# Patient Record
Sex: MALE | Race: WHITE | NOT HISPANIC OR LATINO
[De-identification: names, ages, dates, MRNs, and addresses within clinical notes are randomized per-mention and may not be internally consistent; named-entity substitution may affect disease eponyms.]

---

## 2019-03-02 ENCOUNTER — TRANSCRIPTION ENCOUNTER (OUTPATIENT)
Age: 69
End: 2019-03-02

## 2019-03-03 ENCOUNTER — EMERGENCY (EMERGENCY)
Facility: HOSPITAL | Age: 69
LOS: 1 days | Discharge: HOME | End: 2019-03-03
Attending: EMERGENCY MEDICINE | Admitting: EMERGENCY MEDICINE
Payer: MEDICARE

## 2019-03-03 VITALS
HEIGHT: 70 IN | RESPIRATION RATE: 16 BRPM | OXYGEN SATURATION: 92 % | WEIGHT: 190.04 LBS | TEMPERATURE: 98 F | HEART RATE: 76 BPM | SYSTOLIC BLOOD PRESSURE: 109 MMHG | DIASTOLIC BLOOD PRESSURE: 84 MMHG

## 2019-03-03 VITALS
TEMPERATURE: 98 F | HEART RATE: 73 BPM | DIASTOLIC BLOOD PRESSURE: 84 MMHG | RESPIRATION RATE: 16 BRPM | SYSTOLIC BLOOD PRESSURE: 151 MMHG | OXYGEN SATURATION: 97 %

## 2019-03-03 PROCEDURE — 70450 CT HEAD/BRAIN W/O DYE: CPT

## 2019-03-03 PROCEDURE — 72125 CT NECK SPINE W/O DYE: CPT | Mod: 26

## 2019-03-03 PROCEDURE — 99284 EMERGENCY DEPT VISIT MOD MDM: CPT | Mod: 25

## 2019-03-03 PROCEDURE — 14040 TIS TRNFR F/C/C/M/N/A/G/H/F: CPT

## 2019-03-03 PROCEDURE — 12051 INTMD RPR FACE/MM 2.5 CM/<: CPT

## 2019-03-03 PROCEDURE — 70450 CT HEAD/BRAIN W/O DYE: CPT | Mod: 26

## 2019-03-03 PROCEDURE — 72125 CT NECK SPINE W/O DYE: CPT

## 2019-03-03 PROCEDURE — 99284 EMERGENCY DEPT VISIT MOD MDM: CPT

## 2019-03-03 PROCEDURE — 96372 THER/PROPH/DIAG INJ SC/IM: CPT

## 2019-03-03 RX ORDER — MICONAZOLE NITRATE 2 %
1 CREAM (GRAM) TOPICAL
Qty: 0 | Refills: 0 | COMMUNITY

## 2019-03-03 RX ORDER — TELMISARTAN 20 MG/1
1 TABLET ORAL
Qty: 0 | Refills: 0 | COMMUNITY

## 2019-03-03 RX ORDER — NYSTATIN CREAM 100000 [USP'U]/G
1 CREAM TOPICAL
Qty: 0 | Refills: 0 | COMMUNITY

## 2019-03-03 RX ORDER — ACETAMINOPHEN 500 MG
2 TABLET ORAL
Qty: 0 | Refills: 0 | COMMUNITY

## 2019-03-03 RX ORDER — DUREZOL 0.5 MG/ML
1 EMULSION OPHTHALMIC
Qty: 0 | Refills: 0 | COMMUNITY

## 2019-03-03 RX ORDER — DIVALPROEX SODIUM 500 MG/1
4 TABLET, DELAYED RELEASE ORAL
Qty: 0 | Refills: 0 | COMMUNITY

## 2019-03-03 RX ORDER — CHOLECALCIFEROL (VITAMIN D3) 125 MCG
1 CAPSULE ORAL
Qty: 0 | Refills: 0 | COMMUNITY

## 2019-03-03 RX ORDER — MIRTAZAPINE 45 MG/1
1 TABLET, ORALLY DISINTEGRATING ORAL
Qty: 0 | Refills: 0 | COMMUNITY

## 2019-03-03 RX ORDER — DIVALPROEX SODIUM 500 MG/1
2 TABLET, DELAYED RELEASE ORAL
Qty: 0 | Refills: 0 | COMMUNITY

## 2019-03-03 RX ADMIN — Medication 2 MILLIGRAM(S): at 12:30

## 2019-03-03 NOTE — ED PROVIDER NOTE - OBJECTIVE STATEMENT
Per EMS, 67 y/o M pt with PMHx of dementia presents to the ED BIBA from [facility] c/o forehead laceration s/p unwitnessed fall today. Pt was found on the floor by assisted living staff. No new injury noted. No further complaints at this time. Full HPI limited due to pt's hx of dementia.

## 2019-03-03 NOTE — ED ADULT NURSE NOTE - OBJECTIVE STATEMENT
Pt BIBA from [facility] for a fall incident and laceration over the medial forehead area . Pt is confused, uncooperative and agitated at times. Pt presented with a laceration wound a triangular shape measuring 5x4x3 - Dressing soaked with blood. - Addition pressure dressing applied . Calming measures and reorientation measures provided.

## 2019-03-03 NOTE — CONSULT NOTE ADULT - SUBJECTIVE AND OBJECTIVE BOX
JONNIE HASNON  [id number]      68y y/o presents with laceration after mechanical fall. Patient denies LOC, changes in vision, changes in teeth alignment, nausea or emesis.  Patient agitated at moment.     Handoff  MEWS Score  Alzheimer disease  Hypertension  Facial laceration, initial encounter  FALL  Fall, initial encounter      Allergy Status Unknown      T(C): 37.3 (03-03-19 @ 13:06), Max: 37.3 (03-03-19 @ 13:06)  HR: 76 (03-03-19 @ 17:02) (66 - 76)  BP: 114/41 (03-03-19 @ 17:02) (109/84 - 148/60)  RR: 16 (03-03-19 @ 17:02) (16 - 16)  SpO2: 98% (03-03-19 @ 17:02) (92% - 98%)    NAD  HEENT:  EOMi.  PERRLA.  No facial tenderness.  Intranasal: No injuries.  Intraoral: No injuries.  NO loose dentures.  Laceration: 4cm x 3cm  in left forehead deep to periosteal layer with necrotic tissue.  CN2-12 intact.          EXAM:  CT BRAIN                            *** ADDENDUM 03/03/2019  ***        CT cervical spine without IV contrast        CLINICAL INFORMATION: Fracture, trauma, neck pain.  Neck pain, spinal   stenosis, spondylosis.    fall    TECHNIQUE:  Contiguous axial 2.0 mm sections were obtained through the   cervical spine using a single helical acquisition.  Additional 2 mm   sagittal and coronal reformatted reconstructions of the spine were   obtained.  These additional reformatted images were used to evaluate the   spine for alignment, vertebral fractures and the integrity of the the   posterior elements.   This scan was performed using automatic exposure   control (radiation dose reduction software) to obtain a diagnostic image   quality scan with patient dose as low as reasonably achievable.        FINDINGS:   No prior similar studies are available for review    Cervical vertebral body heights are maintained. No vertebral fracture is   seen. No destructive bone lesion is found.  Alignment is preserved.    Facet joints appear intact and aligned.    Cervical intervertebral disc spaces show multilevel degenerative disc   disease and spondylosis at C3-4 through C6-7 with loss of disc height and   associated degenerative endplate changes. There is narrowing of the RIGHT   C2-3, BILATERAL C3-4, C4-5, C5-6 and C6-7 neural foramina due to   uncovertebral spurring and facet osteophytic hypertrophy.   The skull   base appears intact.  No neck mass is recognized.  Paraspinal soft   tissues appear intact. Visualized lymph nodes appear to be within   physiologic size limits.           IMPRESSION:   No vertebral fracture is recognized.   Multilevel   degenerative disc disease and spondylosis at C3-4 through C6-7 with loss   of disc height and associated degenerative endplate changes. There is   narrowing of the RIGHT C2-3, BILATERAL C3-4, C4-5, C5-6 and C6-7 neural   foramina due to uncovertebral spurring and facet osteophytic hypertrophy.     .                *** END OF ADDENDUM 03/03/2019  ***            PROCEDURE DATE:  03/03/2019          INTERPRETATION:      CT head without IV contrast        CLINICAL INFORMATION:  Fall   Intracranial hemorrhage.    TECHNIQUE: Contiguous axial 5 mm sections were obtained through the head.   Sagittal and coronal 2-D reformatted images were also obtained.   This   scan was performed using automatic exposure control (radiation dose   reduction software) to obtain a diagnostic image quality scan with   patient dose as low as reasonably achievable.     FINDINGS:   CT dated 10/9/2013 is available for review.    The brain demonstrates mild periventricular white matter ischemia.   No   acute cerebral cortical infarct is seen.  No intracranial hemorrhage is   found.  No mass effect is found in the brain.      The ventricles, sulci and basal cisterns show global atrophy,   predominantly decreased centrally.         The orbits are unremarkable.  The paranasal sinuses are significant for   mucosal thickening in the BILATERAL maxillary, ethmoid and LEFT sinuses.    The nasal cavity appears intact.  The nasopharynx is symmetric.  The   central skull base, petrous temporal bones and calvarium remain intact.      IMPRESSION: Mild periventricular white matter ischemia. Global atrophy.          ***Please see the addendum at the top of this report. It may contain   additional important information or changes.****

## 2019-03-03 NOTE — ED PROVIDER NOTE - PROGRESS NOTE DETAILS
Contacted Dr Elliott advice conservative repair feel uncomfortable with the suggestion will consult another surgeon.

## 2019-03-03 NOTE — ED ADULT NURSE REASSESSMENT NOTE - NS ED NURSE REASSESS COMMENT FT1
Pt is agitated , confused and uncooperative - Pt keeps removing his dressing.
Pt reevaluated - Pt is cooperative and calm .
Pt revisited. Pt seen calm and resting.
Pt wound bleeding controlled - Pt awaiting for Plastic MD for wound repair.
Wound bleeding controlled with pressure dressing. Pt is still confused but calmer.
Pt remain confused , agitated , uncooperative and combative after calming measures, relaxation, reorientation to situation - given to the patient . Pt refereed to Dr Ryan . Pt reevaluated by Dr Ryan.
Pt revisited - Seen asleep

## 2019-03-03 NOTE — ED PROVIDER NOTE - CLINICAL SUMMARY MEDICAL DECISION MAKING FREE TEXT BOX
69 y/o M pt presents to the ED BIBA c/o forehead laceration s/p unwitnessed fall today. Will CT head, CT C-Spine, repair laceration in ED.

## 2019-03-03 NOTE — ED ADULT NURSE NOTE - NSIMPLEMENTINTERV_GEN_ALL_ED
Implemented All Fall with Harm Risk Interventions:  [place] to call system. Call bell, personal items and telephone within reach. Instruct patient to call for assistance. Room bathroom lighting operational. Non-slip footwear when patient is off stretcher. Physically safe environment: no spills, clutter or unnecessary equipment. Stretcher in lowest position, wheels locked, appropriate side rails in place. Provide visual cue, wrist band, yellow gown, etc. Monitor gait and stability. Monitor for mental status changes and reorient to person, place, and time. Review medications for side effects contributing to fall risk. Reinforce activity limits and safety measures with patient and family. Provide visual clues: red socks.

## 2019-03-03 NOTE — CONSULT NOTE ADULT - ASSESSMENT
Procedure:  Left supraoribtal nerve block.  Washout of wound with betadine.  Excisional debridement skin to bone level.  Frontalis muscle undermined, advanced, repaired with 5.0 vicryl.  Skin flaps widely undermined, back cut on lateral aspect,  advanced medially, repaired with 5.0 vicryl/5.0 fast absorb gut.  Steri strip applied.    A/P: 68 y.o with laceration s/p repair.  - Head elevation  - Tylenol pain prn  - Tetanus  - STeristrip removal in 2 weeks  - F/U 14 days      Thank You  Domingo Najera MD  Plastic Surgery  [phone]

## 2019-03-03 NOTE — ED ADULT NURSE REASSESSMENT NOTE - GENERAL PATIENT STATE
agitated - keep removing his dressing
calm/comfortable appearance/resting/sleeping
comfortable appearance
comfortable appearance
resting/sleeping
comfortable appearance

## 2019-03-03 NOTE — ED PROVIDER NOTE - CARE PLAN
Principal Discharge DX:	Facial laceration, initial encounter  Secondary Diagnosis:	Fall, initial encounter